# Patient Record
(demographics unavailable — no encounter records)

---

## 2017-06-07 NOTE — RAD
LEFT ANKLE THREE VIEWS:

6/7/17

 

HISTORY: 

Fall with ankle pain.

 

There are no signs of fracture or dislocation. There appears to be a small joint effusion. If patien
t's pain persists, a followup in approximately one week would be recommended to exclude occult injur
y.

 

IMPRESSION:  

No evidence of fracture. 

 

POS: [facility]